# Patient Record
Sex: FEMALE | Race: OTHER
[De-identification: names, ages, dates, MRNs, and addresses within clinical notes are randomized per-mention and may not be internally consistent; named-entity substitution may affect disease eponyms.]

---

## 2019-02-11 ENCOUNTER — HOSPITAL ENCOUNTER (OUTPATIENT)
Dept: HOSPITAL 89 - CT | Age: 34
End: 2019-02-11
Attending: FAMILY MEDICINE

## 2019-02-11 DIAGNOSIS — R19.09: Primary | ICD-10-CM

## 2019-02-11 PROCEDURE — 74178 CT ABD&PLV WO CNTR FLWD CNTR: CPT

## 2019-02-12 NOTE — RADIOLOGY IMAGING REPORT
[place] [facility] [place] 

 

PATIENT NAME: Claudia Montes

: 1985

MR: [id number]

V: [id number]

EXAM DATE: 

ORDERING PHYSICIAN: TOBY HANNA

TECHNOLOGIST: 

 

Location: [facility] [place]

Patient: Claudia Montes

: 1985

MRN: [id number]

Visit/Account:[id number]

Date of Sevice:  2019

 

ACCESSION #: [id number]

 

EXAMINATION:

CT abdomen without IV contrast

CT abdomen with IV contrast

CT pelvis without IV contrast

CT pelvis with IV contrast

 

HISTORY:   Abnormal pelvic ultrasound.

 

TECHNIQUE:   Spiral scans were obtained through the abdomen and pelvis before and during injection of
 nonionic iodinated intravenous contrast.  Sagittal and coronal reformatted images are also submitted
.

 

One of the following dose optimization techniques was utilized in the performance of this exam: Autom
ated exposure control; adjustment of the mA and/or kV according to the patient's size; or use of an i
terative  reconstruction technique.  Specific details can be referenced in the facility's radiology C
T exam operational policy.

 

CONTRAST:   75 mL of IV Isovue-370

 

COMPARISON:   The prior pelvic ultrasound is not available.

 

FINDINGS:

 

Lower chest: Negative.

 

Liver / biliary: The gallbladder is either absent or completely collapsed.  Otherwise negative.

 

Pancreas: Negative.

 

Spleen: Negative.

 

Adrenal glands: Negative.

 

Kidneys: Negative.

 

Pelvic  structures: 10.8 x 9.3 x 7.7 cm cystic mass with enhancing septations adjacent to the left 
ovary and left upper uterus.

 

Bowel: Normal appendix.  Otherwise negative.

 

Peritoneum / retroperitoneum / mesenteries: Mild free fluid in the pelvis.  No free air.

 

Vessels: Negative.

 

Lymph nodes: Negative.

 

Musculoskeletal / Body wall: Negative.

 

IMPRESSION:

 

1.  10.8 x 9.3 x 7.7 cm cystic mass with enhancing septations adjacent to the left ovary and left upp
er uterus.  Mild free fluid in the pelvis.  This could be a benign or malignant cystic ovarian tumor 
or an exophytic ovarian tumor.  Pelvic MRI with IV contrast and gynecologic oncology consultation are
 recommended.

 

2.  The gallbladder is either absent or completely collapsed.

 

3.  The patient developed itching after receiving IV contrast and should be premedicated prior to rec
eiving IV iodinated contrast in the future.

 

Report Dictated By: Valentin Beard MD at 2019 1:13 PM

 

Report E-Signed By: Valentin Beard MD  at 2019 1:46 PM

 

WSN:[id number]